# Patient Record
Sex: MALE | Race: WHITE | HISPANIC OR LATINO | Employment: OTHER | URBAN - METROPOLITAN AREA
[De-identification: names, ages, dates, MRNs, and addresses within clinical notes are randomized per-mention and may not be internally consistent; named-entity substitution may affect disease eponyms.]

---

## 2017-09-25 ENCOUNTER — HOSPITAL ENCOUNTER (EMERGENCY)
Facility: HOSPITAL | Age: 27
Discharge: HOME | End: 2017-09-25
Attending: EMERGENCY MEDICINE

## 2017-09-25 VITALS
OXYGEN SATURATION: 97 % | TEMPERATURE: 98 F | BODY MASS INDEX: 33.75 KG/M2 | HEIGHT: 66 IN | HEART RATE: 130 BPM | DIASTOLIC BLOOD PRESSURE: 77 MMHG | RESPIRATION RATE: 21 BRPM | SYSTOLIC BLOOD PRESSURE: 124 MMHG | WEIGHT: 210 LBS

## 2017-09-25 DIAGNOSIS — M25.562 ACUTE PAIN OF LEFT KNEE: Primary | ICD-10-CM

## 2017-09-25 DIAGNOSIS — M25.569 KNEE PAIN: ICD-10-CM

## 2017-09-25 PROCEDURE — 25000003 PHARM REV CODE 250: Performed by: EMERGENCY MEDICINE

## 2017-09-25 PROCEDURE — 63600175 PHARM REV CODE 636 W HCPCS: Performed by: EMERGENCY MEDICINE

## 2017-09-25 PROCEDURE — 99283 EMERGENCY DEPT VISIT LOW MDM: CPT

## 2017-09-25 PROCEDURE — 96372 THER/PROPH/DIAG INJ SC/IM: CPT

## 2017-09-25 PROCEDURE — 29505 APPLICATION LONG LEG SPLINT: CPT

## 2017-09-25 RX ORDER — HYDROCODONE BITARTRATE AND ACETAMINOPHEN 10; 325 MG/1; MG/1
1 TABLET ORAL EVERY 8 HOURS PRN
Qty: 11 TABLET | Refills: 0 | Status: SHIPPED | OUTPATIENT
Start: 2017-09-25

## 2017-09-25 RX ORDER — MORPHINE SULFATE 2 MG/ML
4 INJECTION, SOLUTION INTRAMUSCULAR; INTRAVENOUS
Status: COMPLETED | OUTPATIENT
Start: 2017-09-25 | End: 2017-09-25

## 2017-09-25 RX ORDER — ONDANSETRON 2 MG/ML
4 INJECTION INTRAMUSCULAR; INTRAVENOUS
Status: COMPLETED | OUTPATIENT
Start: 2017-09-25 | End: 2017-09-25

## 2017-09-25 RX ORDER — CYCLOBENZAPRINE HCL 10 MG
10 TABLET ORAL 3 TIMES DAILY PRN
Qty: 15 TABLET | Refills: 0 | Status: SHIPPED | OUTPATIENT
Start: 2017-09-25 | End: 2017-09-30

## 2017-09-25 RX ORDER — METHOCARBAMOL 500 MG/1
1000 TABLET, FILM COATED ORAL
Status: COMPLETED | OUTPATIENT
Start: 2017-09-25 | End: 2017-09-25

## 2017-09-25 RX ADMIN — ONDANSETRON 4 MG: 2 INJECTION INTRAMUSCULAR; INTRAVENOUS at 08:09

## 2017-09-25 RX ADMIN — METHOCARBAMOL 1000 MG: 500 TABLET ORAL at 08:09

## 2017-09-25 RX ADMIN — MORPHINE SULFATE 4 MG: 2 INJECTION, SOLUTION INTRAMUSCULAR; INTRAVENOUS at 08:09

## 2017-09-26 NOTE — ED PROVIDER NOTES
"Encounter Date: 9/25/2017       History     Chief Complaint   Patient presents with    Knee Pain     Patient was jogging when suddenly felt a "tear" in his left knee, injury occured approximately 30 minutes ago. No obvious swelling or deformity noted.      Patient is a 27-year-old male presents today after injuring his left.  He said he was running in the park about an hour ago when he accidentally stepped in a hole and fell.  He heard and felt something "pop" in his left knee.  C/o throbbing pain to left anterior knee, no radiation.  He has not tried taking anything for it yet.  Pain is worse with movement.            Review of patient's allergies indicates:  No Known Allergies  No past medical history on file.  No past surgical history on file.  No family history on file.  Social History   Substance Use Topics    Smoking status: Not on file    Smokeless tobacco: Not on file    Alcohol use Not on file     Review of Systems   Constitutional: Negative for chills and fever.   HENT: Negative for congestion, ear pain, rhinorrhea, sore throat and trouble swallowing.    Eyes: Negative for pain, redness and visual disturbance.   Respiratory: Negative for cough, shortness of breath and wheezing.    Cardiovascular: Negative for chest pain and palpitations.   Gastrointestinal: Negative for abdominal pain, constipation, diarrhea, nausea and vomiting.   Genitourinary: Negative for dysuria and hematuria.   Musculoskeletal: Positive for arthralgias. Negative for back pain, joint swelling, neck pain and neck stiffness.   Skin: Negative for rash and wound.   Neurological: Negative for dizziness, syncope, weakness, light-headedness and headaches.   Hematological: Does not bruise/bleed easily.   Psychiatric/Behavioral: Negative for confusion.       Physical Exam     Initial Vitals [09/25/17 1942]   BP Pulse Resp Temp SpO2   124/77 (!) 130 (!) 21 98.4 °F (36.9 °C) 97 %      MAP       92.67         Physical Exam    Nursing note and " vitals reviewed.  Constitutional: He appears well-developed and well-nourished. He is not diaphoretic. No distress.   HENT:   Head: Normocephalic and atraumatic.   Right Ear: External ear normal.   Left Ear: External ear normal.   Nose: Nose normal.   Eyes: EOM are normal. Right eye exhibits no discharge. Left eye exhibits no discharge.   Neck: Normal range of motion. Neck supple.   Cardiovascular: Normal rate, regular rhythm, normal heart sounds and intact distal pulses. Exam reveals no gallop and no friction rub.    No murmur heard.  Pulses:       Dorsalis pedis pulses are 2+ on the right side, and 2+ on the left side.        Posterior tibial pulses are 2+ on the right side, and 2+ on the left side.   Pulmonary/Chest: Breath sounds normal. No respiratory distress. He has no wheezes. He has no rhonchi. He has no rales. He exhibits no tenderness.   Abdominal: Soft. Bowel sounds are normal. He exhibits no distension and no mass. There is no tenderness. There is no rebound and no guarding.   Musculoskeletal: He exhibits no edema.        Left knee: He exhibits decreased range of motion and bony tenderness. He exhibits no swelling, no ecchymosis, no deformity and no erythema. Tenderness found.   Diffuse TTP,  Both lower ext NVI, sensation intact.    Neurological: He is alert and oriented to person, place, and time. He has normal strength. No cranial nerve deficit or sensory deficit.   Skin: Skin is warm and dry. Capillary refill takes less than 2 seconds. No erythema.   Psychiatric: He has a normal mood and affect. His behavior is normal. Thought content normal.         ED Course   Procedures  Labs Reviewed - No data to display     Imaging Results          X-Ray Knee 3 View Left (Final result)  Result time 09/25/17 21:26:58    Final result by Fabrizio Jacinto MD (09/25/17 21:26:58)                 Impression:        No acute bony abnormality.      Electronically signed by: Fabrizio Jacinto  Date:  "    09/25/17  Time:    21:26              Narrative:    COMPARISON: None.    CLINICAL INFORMATION: Knee pain.    FINDINGS: Three views of the left knee.  Examination is limited by suboptimal crosstable lateral view. No evidence of acute fracture or dislocation. No radiopaque foreign body. Possible suprapatellar effusion, limited by patient positioning.                                 Medical Decision Making:   Initial Assessment:   Patient here with left knee pain after a fall while running,  States that he heard something "pop".  Denies numbness or tingling distally.    Differential Diagnosis:   Fracture, dislocation, ligament injury, tendon injury, contusion  Clinical Tests:   Radiological Study: Ordered and Reviewed  ED Management:  Pain controlled in ED    XR negative for acute fracture or dislocation.  Will place in knee immobilizer, crutches and have him follow up with ortho.                     ED Course as of Sep 25 2225   Mon Sep 25, 2017   2108 BP: 124/77 [LD]   2108 Temp: 98.4 °F (36.9 °C) [LD]   2108 Pulse: (!) 130 [LD]   2108 Resp: (!) 21 [LD]   2108 SpO2: 97 % [LD]   2135 Patient is much more comfortable.  States that pain meds have really helped him.    [LD]   2203 Repeat HR improved after pain management.  HR ~ 100 bpm  [LD]      ED Course User Index  [LD] Lynn Self MD     Clinical Impression:   The primary encounter diagnosis was Acute pain of left knee. A diagnosis of Knee pain was also pertinent to this visit.                           Lynn Self MD  09/25/17 2226    "